# Patient Record
Sex: FEMALE | Race: BLACK OR AFRICAN AMERICAN
[De-identification: names, ages, dates, MRNs, and addresses within clinical notes are randomized per-mention and may not be internally consistent; named-entity substitution may affect disease eponyms.]

---

## 2018-09-07 ENCOUNTER — HOSPITAL ENCOUNTER (OUTPATIENT)
Dept: HOSPITAL 74 - FASU | Age: 83
Discharge: HOME | End: 2018-09-07
Attending: ORTHOPAEDIC SURGERY
Payer: COMMERCIAL

## 2018-09-07 VITALS — DIASTOLIC BLOOD PRESSURE: 80 MMHG | SYSTOLIC BLOOD PRESSURE: 153 MMHG | HEART RATE: 61 BPM

## 2018-09-07 VITALS — TEMPERATURE: 97.5 F

## 2018-09-07 VITALS — BODY MASS INDEX: 34 KG/M2

## 2018-09-07 DIAGNOSIS — G56.01: Primary | ICD-10-CM

## 2018-09-07 PROCEDURE — 01N50ZZ RELEASE MEDIAN NERVE, OPEN APPROACH: ICD-10-PCS | Performed by: ORTHOPAEDIC SURGERY

## 2018-09-07 NOTE — OP
Operative Note





- Note:


Operative Date: 09/07/18


Pre-Operative Diagnosis: Right wrist/hand carpal tunnel syndrome


Operation: Right wrist/hand open carpal tunnel release


Findings: 





Tourniquet Pressure: 250mmHg


Tourniquet Time: 22 min.


Post-Operative Diagnosis: Same as Pre-op


Surgeon: Benja Lares


Assistant: Moe Lares


Anesthesiologist/CRNA: Marina Kate


Anesthesia: General


Estimated Blood Loss (mls): 0


Fluid Volume Replaced (mls): 400 (Crystalloid)


Operative Report Dictated: Yes

## 2018-09-07 NOTE — PN
Progress Note (short form)





- Note


Progress Note: 





82F s/p right open carpal tunnel release POD #0.





-Pain control.


-Incentive spirometry.


-NWB RUE.


-Keep dressing clean & dry.


-Percocet, meloxicam ordered to pharmacy for analgesia; OK to use OTC NSAID's 

instead.


-Elevate wrist/hand above level of heart.


-Discharge home: f/u [facility] [place] [facility] Wed 9/12/2018; call for 

appointment: [phone].





Benja Lares MD (Orthopaedic Surgery).

## 2018-09-10 NOTE — OP
Date of Operation:  09/07/2018

Surgeon: Benja Lares MD

Assistant: Moe Lares MD

Pre-Operative Diagnosis: Right wrist/hand carpal tunnel syndrome.

Post-Operative Diagnosis: Right wrist/hand carpal tunnel syndrome.

Surgical Procedure: Right open carpal tunnel release. 

Anaesthesia: General, LMA.

Position: Supine

Incision: Longitudinal.

Tourniquet Pressure: 250mmHg.

Tourniquet Time: 22 minutes.

Estimated Blood Loss: 0cc.

Intravenous Fluid: 400cc.

Specimens: None.

Drains: None.

Complications: None.

Urine output: None.

Bacteriology: None.

Transfusions: None.

Closure: 3-0 Biosyn.



Indications:

Ting Torres is an 82 year old female who was indicated for a right open carpal 
tunnel release in order to ameliorate the symptoms associated with carpal 
tunnel syndrome, and improve the use of her hand.

The patient was identified in the holding area by her armband. A long 
discussion was held with the patient regarding the risks, benefits and 
alternatives of the above-named procedure. Risks include but are not limited to
: pain, bleeding, infection, damage to surrounding structures (including nerves
, blood vessels, skin, ligaments, tendons and bone), reflex sympathetic 
dystrophy (RSD), incomplete carpal tunnel release, wound complications, need 
for further surgery, blood clots, myocardial infarction, pulmonary embolism, 
anesthesia complications, compartment syndrome, limb loss, limp, loss of 
function, and death.

Benefits as mentioned above.

Alternatives include no surgery.

All questions were answered.

The patient appeared to understand and agreed to the procedure. Informed 
consent was obtained, witnessed and verified. The patients correct operative 
limb - the right upper extremity - was marked, and the patient was taken to the 
operating room after being seen by the anesthesia and nursing staff.



Procedure: 

The patient was brought into the operating room, placed supine on the OR table 
and secured with a safety strap.

Consent and the operative site was again verified with the patient and nursing 
and anaesthesia staff.

Anaesthesia was then administered without complication.

2g IV Ancef was administered.

A time-out was done led by me, the attending surgeon.

The patient was positioned with all bony prominences well padded.

A tourniquet was placed proximally on the right arm and set to 250mmHg.

The operative limb was prepped in standard sterile fashion using betadine prep 
& scrub, wiped off with alcohol, DuraPrep applied, and then free draped.

A time-out was repeated, the right upper extremity was exsanguinated using an 
Esmarch, the tourniquet was inflated, and the case began.

A longitudinal incision was made in line with the radial border of the fourth 
finger, extending from the volar wrist crease proximally to the intersection of 
the Campbell cardinal line.

Ragnell retractors were used facilitate visualization of the wound.

Sharp dissection was carried through the subcutaneous fat and palmar 
aponeurosis down to the level of the transverse carpal ligament (TCL).

The palmaris brevis muscle was visualized, and its ulnar-most fibers were 
gently feathered off the underlying TCL.

The TCL was then incised longitudinally and elevated using a mosquito clamp as 
the wrist was flexed.

With the wrist maintained in a flexed position, the TCL was fully released 
proximally and distally using Metzenbaum scissors.

Proximally, the distal deep fascia of the forearm, and the palmar aponeurosis 
were released along with the TCL.

Distally, great care was taken not to extend the reach of the scissors more 
than 3-5mm distal to the distal extent of the TCL to avoid vascular injury. 

With finger palpation, full release of the carpal tunnel was assured.

A freer elevator was used to mobilize the free flaps of the incised TCL up and 
off the median nerve, again ensuring its full release.

The median nerve was visualized and appeared pale.

The palmar cutaneous branch of the median nerve was not seen.

The wound was then copiously irrigated using normal saline solution.

The skin was closed using a 3-0 nylon suture in simple interrupted fashion.

A sterile compressive dressing was applied.

The tourniquet was released at a final time of 22 minutes.

The sponge and needle counts were correct at the end of the case and I, the 
attending surgeon, was present and scrubbed throughout the case.

The patient was then transferred to the recovery room in stable condition, as 
per the anesthesia team, having tolerated the procedure well.



MD AMANDA Powell/[id number]

DD: 09/08/2018 17:05

DT: 09/09/2018 12:57

Job #:  [id number]

MTDD